# Patient Record
Sex: FEMALE | Race: WHITE | NOT HISPANIC OR LATINO | URBAN - METROPOLITAN AREA
[De-identification: names, ages, dates, MRNs, and addresses within clinical notes are randomized per-mention and may not be internally consistent; named-entity substitution may affect disease eponyms.]

---
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
## 2024-11-04 ENCOUNTER — NURSE TRIAGE (OUTPATIENT)
Dept: OTHER | Facility: OTHER | Age: 19
End: 2024-11-04

## 2024-11-04 NOTE — TELEPHONE ENCOUNTER
"Reason for Disposition   Genital area looks infected (e.g., draining sore, spreading redness)    Answer Assessment - Initial Assessment Questions  1. SYMPTOM: \"What's the main symptom you're concerned about?\" (e.g., rash, itching, swelling, dryness)      Swelling and pain  2. LOCATION: \"Where is the  lump located?\" (e.g., inside/outside, left/right)      Left Labia-lump is larger than a pea size  3. ONSET: \"When did the  lump  start?\"      3 days ago.lump started  4. PAIN: \"Is there any pain?\" If Yes, ask: \"How bad is it?\" (Scale: 1-10; mild, moderate, severe)      7/10  5. CAUSE: \"What do you think is causing the symptoms?\"      Unsure but she has had it two times in the past.  6. OTHER SYMPTOMS: \"Do you have any other symptoms?\" (e.g., fever, vaginal bleeding, pain with urination)      No other SXS  7. PREGNANCY: \"Is there any chance you are pregnant?\" \"When was your last menstrual period?\"      No    Protocols used: Vulvar Symptoms-Adult-AH    "

## 2024-11-04 NOTE — TELEPHONE ENCOUNTER
Patient is calling for an appointment. I explained to her that she had not been seen in over 4 years at this office and she would need a New Patient appointment also. Please call her back to set these up.

## 2024-11-05 NOTE — TELEPHONE ENCOUNTER
Lm for patient to call back and confirm new patient appt    Scheduled as urgent np for 11/7 at 130 - need her to confirm